# Patient Record
Sex: MALE | URBAN - METROPOLITAN AREA
[De-identification: names, ages, dates, MRNs, and addresses within clinical notes are randomized per-mention and may not be internally consistent; named-entity substitution may affect disease eponyms.]

---

## 2018-02-04 ENCOUNTER — HOSPITAL ENCOUNTER (EMERGENCY)
Facility: MEDICAL CENTER | Age: 28
End: 2018-02-04

## 2018-02-04 VITALS
TEMPERATURE: 97 F | OXYGEN SATURATION: 92 % | HEART RATE: 120 BPM | RESPIRATION RATE: 20 BRPM | HEIGHT: 65 IN | DIASTOLIC BLOOD PRESSURE: 95 MMHG | SYSTOLIC BLOOD PRESSURE: 116 MMHG

## 2018-02-04 PROCEDURE — 302449 STATCHG TRIAGE ONLY (STATISTIC)

## 2018-02-04 NOTE — ED NOTES
"Per ED tech Pt walked out of ER after being angered at fact that staff \"[id number]'t give him an inhaler for ten dollars\". Pt dismissed from computer.   "